# Patient Record
Sex: MALE | Race: WHITE | NOT HISPANIC OR LATINO | Employment: FULL TIME | URBAN - METROPOLITAN AREA
[De-identification: names, ages, dates, MRNs, and addresses within clinical notes are randomized per-mention and may not be internally consistent; named-entity substitution may affect disease eponyms.]

---

## 2020-08-04 PROCEDURE — 99285 EMERGENCY DEPT VISIT HI MDM: CPT | Mod: 25

## 2020-08-04 PROCEDURE — 93010 EKG 12-LEAD: ICD-10-PCS | Mod: ,,, | Performed by: INTERNAL MEDICINE

## 2020-08-04 PROCEDURE — 93005 ELECTROCARDIOGRAM TRACING: CPT

## 2020-08-04 PROCEDURE — 93010 ELECTROCARDIOGRAM REPORT: CPT | Mod: ,,, | Performed by: INTERNAL MEDICINE

## 2020-08-05 ENCOUNTER — HOSPITAL ENCOUNTER (EMERGENCY)
Facility: HOSPITAL | Age: 45
Discharge: HOME | End: 2020-08-05
Attending: EMERGENCY MEDICINE
Payer: COMMERCIAL

## 2020-08-05 VITALS
SYSTOLIC BLOOD PRESSURE: 164 MMHG | HEIGHT: 71 IN | WEIGHT: 275 LBS | DIASTOLIC BLOOD PRESSURE: 115 MMHG | OXYGEN SATURATION: 97 % | TEMPERATURE: 98 F | HEART RATE: 60 BPM | BODY MASS INDEX: 38.5 KG/M2 | RESPIRATION RATE: 14 BRPM

## 2020-08-05 DIAGNOSIS — R07.9 CHEST PAIN: ICD-10-CM

## 2020-08-05 LAB
ALBUMIN SERPL BCP-MCNC: 4.4 G/DL (ref 3.5–5.2)
ALP SERPL-CCNC: 73 U/L (ref 55–135)
ALT SERPL W/O P-5'-P-CCNC: 75 U/L (ref 10–44)
ANION GAP SERPL CALC-SCNC: 9 MMOL/L (ref 8–16)
AST SERPL-CCNC: 55 U/L (ref 10–40)
BASOPHILS # BLD AUTO: 0.06 K/UL (ref 0–0.2)
BASOPHILS NFR BLD: 0.7 % (ref 0–1.9)
BILIRUB SERPL-MCNC: 0.9 MG/DL (ref 0.1–1)
BUN SERPL-MCNC: 12 MG/DL (ref 6–20)
CALCIUM SERPL-MCNC: 9.2 MG/DL (ref 8.7–10.5)
CHLORIDE SERPL-SCNC: 104 MMOL/L (ref 95–110)
CO2 SERPL-SCNC: 27 MMOL/L (ref 23–29)
CREAT SERPL-MCNC: 1.2 MG/DL (ref 0.5–1.4)
DIFFERENTIAL METHOD: ABNORMAL
EOSINOPHIL # BLD AUTO: 0.7 K/UL (ref 0–0.5)
EOSINOPHIL NFR BLD: 7.3 % (ref 0–8)
ERYTHROCYTE [DISTWIDTH] IN BLOOD BY AUTOMATED COUNT: 12.4 % (ref 11.5–14.5)
EST. GFR  (AFRICAN AMERICAN): >60 ML/MIN/1.73 M^2
EST. GFR  (NON AFRICAN AMERICAN): >60 ML/MIN/1.73 M^2
GLUCOSE SERPL-MCNC: 103 MG/DL (ref 70–110)
HCT VFR BLD AUTO: 45.6 % (ref 40–54)
HGB BLD-MCNC: 16.1 G/DL (ref 14–18)
IMM GRANULOCYTES # BLD AUTO: 0.03 K/UL (ref 0–0.04)
IMM GRANULOCYTES NFR BLD AUTO: 0.3 % (ref 0–0.5)
LYMPHOCYTES # BLD AUTO: 3 K/UL (ref 1–4.8)
LYMPHOCYTES NFR BLD: 34 % (ref 18–48)
MCH RBC QN AUTO: 29.6 PG (ref 27–31)
MCHC RBC AUTO-ENTMCNC: 35.3 G/DL (ref 32–36)
MCV RBC AUTO: 84 FL (ref 82–98)
MONOCYTES # BLD AUTO: 0.5 K/UL (ref 0.3–1)
MONOCYTES NFR BLD: 5.6 % (ref 4–15)
NEUTROPHILS # BLD AUTO: 4.7 K/UL (ref 1.8–7.7)
NEUTROPHILS NFR BLD: 52.1 % (ref 38–73)
NRBC BLD-RTO: 0 /100 WBC
PLATELET # BLD AUTO: 202 K/UL (ref 150–350)
PMV BLD AUTO: 10.4 FL (ref 9.2–12.9)
POTASSIUM SERPL-SCNC: 3.5 MMOL/L (ref 3.5–5.1)
PROT SERPL-MCNC: 7.6 G/DL (ref 6–8.4)
RBC # BLD AUTO: 5.44 M/UL (ref 4.6–6.2)
SODIUM SERPL-SCNC: 140 MMOL/L (ref 136–145)
TROPONIN I SERPL DL<=0.01 NG/ML-MCNC: <0.006 NG/ML (ref 0–0.03)
WBC # BLD AUTO: 8.94 K/UL (ref 3.9–12.7)

## 2020-08-05 PROCEDURE — 80053 COMPREHEN METABOLIC PANEL: CPT

## 2020-08-05 PROCEDURE — 84484 ASSAY OF TROPONIN QUANT: CPT

## 2020-08-05 PROCEDURE — 85025 COMPLETE CBC W/AUTO DIFF WBC: CPT

## 2020-08-05 PROCEDURE — 25000003 PHARM REV CODE 250: Performed by: EMERGENCY MEDICINE

## 2020-08-05 RX ORDER — AMLODIPINE BESYLATE 5 MG/1
5 TABLET ORAL DAILY
COMMUNITY

## 2020-08-05 RX ORDER — FAMOTIDINE 20 MG/1
20 TABLET, FILM COATED ORAL 2 TIMES DAILY
Qty: 60 TABLET | Refills: 0 | Status: SHIPPED | OUTPATIENT
Start: 2020-08-05 | End: 2020-09-04

## 2020-08-05 RX ADMIN — LIDOCAINE HYDROCHLORIDE: 20 SOLUTION ORAL; TOPICAL at 03:08

## 2020-08-05 NOTE — ED TRIAGE NOTES
"Pt presents to the ed c/o reolved cp that occurred at approx 8pm last night. Pt states he had a sharp pain that lasted approx 1-2 minutes while simultaneously occurring with what he perceievd as a drop in "blood pressure" and near syncpal episode  "

## 2020-08-05 NOTE — DISCHARGE INSTRUCTIONS
You were seen in the emergency department for chest pain.  Your EKG, labs, exam, are all reassuring.  We are not sure what the cause of your chest pain is however we do not believe it is anything immediately dangerous.  Please follow-up with your primary care provider early this week.  Please return for any new or worsening chest pain, nausea, vomiting, difficulty breathing, coughing up blood, profuse sweating, dizziness, lightheadedness, numbness, weakness, or any other new or worsening concerns.    We noted that your liver enzymes are slightly elevated.  This is not immediately dangerous.  Please discuss this with your primary care provider.

## 2020-08-05 NOTE — PROVIDER PROGRESS NOTES - EMERGENCY DEPT.
" Emergency Department TeleTRIAGE Encounter Note      CHIEF COMPLAINT    Chief Complaint   Patient presents with    Chest Pain     Pt c/o upper left anterior chest pain with a "drop in blood pressure" within the last hour. Pt denies any nausea, vomiting, SOB or cough. Pt took an ASA around 2005.        VITAL SIGNS   Initial Vitals [08/04/20 2111]   BP Pulse Resp Temp SpO2   (!) 176/113 71 16 98.5 °F (36.9 °C) 98 %      MAP       --            ALLERGIES    Review of patient's allergies indicates:   Allergen Reactions    Anise oil Nausea And Vomiting       PROVIDER TRIAGE NOTE  This is a teletriage evaluation of a 45 y.o. male presenting to the ED with c/o chest pain x1 hour.  Patient has risk factors. Initial orders will be placed and care will be transferred to an alternate provider when patient is roomed for a full evaluation. Any additional orders and the final disposition will be determined by that provider.         ORDERS  Labs Reviewed   CBC W/ AUTO DIFFERENTIAL   COMPREHENSIVE METABOLIC PANEL   TROPONIN I       ED Orders (720h ago, onward)    Start Ordered     Status Ordering Provider    08/04/20 2119 08/04/20 2118  Saline lock IV  Once      Ordered DRU JIANG    08/04/20 2119 08/04/20 2118  CBC auto differential  STAT  Collect    Ordered DRU JIANG    08/04/20 2119 08/04/20 2118  Comprehensive metabolic panel  STAT  Collect    Ordered DRU JIANG    08/04/20 2119 08/04/20 2118  Troponin I #1  STAT  Collect    Ordered DRU JIANG    08/04/20 2119 08/04/20 2118  X-Ray Chest AP Portable  1 time imaging      Ordered DRU JIANG    08/04/20 2038 08/04/20 2037  EKG 12-lead  Once  Completed    Completed by REJI STARR on 8/4/2020 at  8:42 PM MARGE AMBROSIO            Virtual Visit Note: The provider triage portion of this emergency department evaluation and documentation was performed via [employer], a HIPAA-compliant telemedicine application, in concert with a tele-presenter in the " room. A face to face patient evaluation with one of my colleagues will occur once the patient is placed in an emergency department room.      DISCLAIMER: This note was prepared with [employer] voice recognition transcription software. Garbled syntax, mangled pronouns, and other bizarre constructions may be attributed to that software system.

## 2023-03-05 ENCOUNTER — HOSPITAL ENCOUNTER (EMERGENCY)
Facility: HOSPITAL | Age: 48
Discharge: HOME | End: 2023-03-05
Attending: EMERGENCY MEDICINE
Payer: COMMERCIAL

## 2023-03-05 VITALS
TEMPERATURE: 99 F | WEIGHT: 275 LBS | DIASTOLIC BLOOD PRESSURE: 95 MMHG | BODY MASS INDEX: 38.5 KG/M2 | HEIGHT: 71 IN | OXYGEN SATURATION: 96 % | HEART RATE: 70 BPM | RESPIRATION RATE: 20 BRPM | SYSTOLIC BLOOD PRESSURE: 142 MMHG

## 2023-03-05 DIAGNOSIS — R07.9 CHEST PAIN: ICD-10-CM

## 2023-03-05 LAB
ALBUMIN SERPL BCP-MCNC: 3.9 G/DL (ref 3.5–5.2)
ALP SERPL-CCNC: 68 U/L (ref 55–135)
ALT SERPL W/O P-5'-P-CCNC: 50 U/L (ref 10–44)
ANION GAP SERPL CALC-SCNC: 13 MMOL/L (ref 8–16)
AST SERPL-CCNC: 41 U/L (ref 10–40)
BASOPHILS # BLD AUTO: 0.07 K/UL (ref 0–0.2)
BASOPHILS NFR BLD: 0.9 % (ref 0–1.9)
BILIRUB SERPL-MCNC: 0.5 MG/DL (ref 0.1–1)
BNP SERPL-MCNC: <10 PG/ML (ref 0–99)
BUN SERPL-MCNC: 15 MG/DL (ref 6–20)
CALCIUM SERPL-MCNC: 9.4 MG/DL (ref 8.7–10.5)
CHLORIDE SERPL-SCNC: 104 MMOL/L (ref 95–110)
CO2 SERPL-SCNC: 24 MMOL/L (ref 23–29)
CREAT SERPL-MCNC: 1.4 MG/DL (ref 0.5–1.4)
DIFFERENTIAL METHOD: NORMAL
EOSINOPHIL # BLD AUTO: 0.5 K/UL (ref 0–0.5)
EOSINOPHIL NFR BLD: 6 % (ref 0–8)
ERYTHROCYTE [DISTWIDTH] IN BLOOD BY AUTOMATED COUNT: 13 % (ref 11.5–14.5)
EST. GFR  (NO RACE VARIABLE): >60 ML/MIN/1.73 M^2
GLUCOSE SERPL-MCNC: 143 MG/DL (ref 70–110)
HCT VFR BLD AUTO: 43.6 % (ref 40–54)
HGB BLD-MCNC: 15.3 G/DL (ref 14–18)
IMM GRANULOCYTES # BLD AUTO: 0.02 K/UL (ref 0–0.04)
IMM GRANULOCYTES NFR BLD AUTO: 0.2 % (ref 0–0.5)
LYMPHOCYTES # BLD AUTO: 2.6 K/UL (ref 1–4.8)
LYMPHOCYTES NFR BLD: 32.7 % (ref 18–48)
MCH RBC QN AUTO: 30.1 PG (ref 27–31)
MCHC RBC AUTO-ENTMCNC: 35.1 G/DL (ref 32–36)
MCV RBC AUTO: 86 FL (ref 82–98)
MONOCYTES # BLD AUTO: 0.6 K/UL (ref 0.3–1)
MONOCYTES NFR BLD: 7.3 % (ref 4–15)
NEUTROPHILS # BLD AUTO: 4.3 K/UL (ref 1.8–7.7)
NEUTROPHILS NFR BLD: 52.9 % (ref 38–73)
NRBC BLD-RTO: 0 /100 WBC
PLATELET # BLD AUTO: 226 K/UL (ref 150–450)
PMV BLD AUTO: 10.4 FL (ref 9.2–12.9)
POTASSIUM SERPL-SCNC: 3.7 MMOL/L (ref 3.5–5.1)
PROT SERPL-MCNC: 7.4 G/DL (ref 6–8.4)
RBC # BLD AUTO: 5.08 M/UL (ref 4.6–6.2)
SODIUM SERPL-SCNC: 141 MMOL/L (ref 136–145)
TROPONIN I SERPL DL<=0.01 NG/ML-MCNC: <0.006 NG/ML (ref 0–0.03)
TROPONIN I SERPL DL<=0.01 NG/ML-MCNC: <0.006 NG/ML (ref 0–0.03)
WBC # BLD AUTO: 8.05 K/UL (ref 3.9–12.7)

## 2023-03-05 PROCEDURE — 85025 COMPLETE CBC W/AUTO DIFF WBC: CPT | Performed by: EMERGENCY MEDICINE

## 2023-03-05 PROCEDURE — 99285 EMERGENCY DEPT VISIT HI MDM: CPT | Mod: 25

## 2023-03-05 PROCEDURE — 80053 COMPREHEN METABOLIC PANEL: CPT | Performed by: EMERGENCY MEDICINE

## 2023-03-05 PROCEDURE — 84484 ASSAY OF TROPONIN QUANT: CPT | Mod: 91 | Performed by: EMERGENCY MEDICINE

## 2023-03-05 PROCEDURE — 83880 ASSAY OF NATRIURETIC PEPTIDE: CPT | Performed by: EMERGENCY MEDICINE

## 2023-03-05 RX ORDER — ASPIRIN 81 MG/1
81 TABLET ORAL DAILY
COMMUNITY

## 2023-03-05 RX ORDER — LISINOPRIL 20 MG/1
20 TABLET ORAL
COMMUNITY
Start: 2023-02-28

## 2023-03-05 NOTE — ED PROVIDER NOTES
Encounter Date: 3/5/2023    SCRIBE #1 NOTE: I, Janet Ac, am scribing for, and in the presence of,  Hadley Whyte MD. the EKG reading. Other sections scribed: HPI, ROS, PE.     History     Chief Complaint   Patient presents with    Chest Pain     Woke up from sleep this morning with left sided chest pain that radiates under arm with sweats, no N/V took 325 ASA PTA     CC: chest pain    HPI: This is a 48 y.o.male patient, with a PMHx of Heart Murmur, HTN, Prostate cancer, High cholesterol, and Sleep apnea, presenting to the ED for further evaluation of stabbing left-sided chest pain beginning this morning. Patient reports the pain woke him up from his sleep. Patient states episode of pain lasted less than 15 minutes. Patient reports associated symptoms of diaphoresis. Patient states he took 325 mg Aspirin PTA for relief. Patient reports having a similar episode of symptoms in the past and was seen here in the ED on 8/5/20. Patient denies any changes in pain with exertion or inspiration. Patient reports he did get a stress test done in the past. Patient denies having a cardiologist at this time. Patient denies taking any blood thinners except a 81 mg Aspirin daily. Patient is a nonsmoker. Patient denies any fever, chills, shortness of breath, abdominal pain, nausea, vomiting, diarrhea, dysuria, or any other associated symptoms. No alleviating or aggravating factors. No known drug allergies.        The history is provided by the patient. No  was used.   Review of patient's allergies indicates:   Allergen Reactions    Anise oil Nausea And Vomiting     Past Medical History:   Diagnosis Date    Heart murmur     Hypertension     Prostate cancer 2017     Past Surgical History:   Procedure Laterality Date    PROSTATE SURGERY  2017     No family history on file.  Social History     Tobacco Use    Smoking status: Never    Smokeless tobacco: Never   Substance Use Topics    Alcohol use: Not Currently     Drug use: Never     Review of Systems   Constitutional:  Positive for diaphoresis. Negative for fever.   HENT:  Negative for congestion and sore throat.    Respiratory:  Negative for shortness of breath.    Cardiovascular:  Positive for chest pain.   Gastrointestinal:  Negative for abdominal pain and nausea.   Genitourinary:  Negative for dysuria.   Musculoskeletal:  Negative for back pain.   Skin:  Negative for rash.   Neurological:  Negative for weakness.   Hematological:  Does not bruise/bleed easily.     Physical Exam     Initial Vitals [03/05/23 0552]   BP Pulse Resp Temp SpO2   (!) 171/109 72 18 98.9 °F (37.2 °C) 98 %      MAP       --         Physical Exam    Nursing note and vitals reviewed.  Constitutional: He appears well-developed and well-nourished.   HENT:   Head: Atraumatic.   Eyes: EOM are normal. Pupils are equal, round, and reactive to light.   Neck: Neck supple. No JVD present.   Normal range of motion.  Cardiovascular:  Normal rate, regular rhythm, normal heart sounds and intact distal pulses.     Exam reveals no gallop and no friction rub.       No murmur heard.  Pulmonary/Chest: Breath sounds normal. No respiratory distress. He exhibits no tenderness.   Abdominal: Abdomen is soft. Bowel sounds are normal. There is no abdominal tenderness.   Rotund abdomen   Musculoskeletal:         General: Edema (trace lower extremity) present. Normal range of motion.      Cervical back: Normal range of motion and neck supple.     Lymphadenopathy:     He has no cervical adenopathy.   Neurological: He is alert and oriented to person, place, and time. He has normal strength.   Skin: Skin is warm and dry.   Psychiatric: He has a normal mood and affect. Thought content normal.       ED Course   Procedures  Labs Reviewed   COMPREHENSIVE METABOLIC PANEL - Abnormal; Notable for the following components:       Result Value    Glucose 143 (*)     AST 41 (*)     ALT 50 (*)     All other components within normal limits    CBC W/ AUTO DIFFERENTIAL   TROPONIN I   B-TYPE NATRIURETIC PEPTIDE   TROPONIN I     EKG Readings: (Independently Interpreted)   Initial Reading: No STEMI. Previous EKG: Compared with most recent EKG Previous EKG Date: August 4th 2020. Rhythm: Normal Sinus Rhythm. Heart Rate: 69. Ectopy: No Ectopy. Clinical Impression: Normal Sinus Rhythm   Nonspecific ST and T wave abnormality   ECG Results              EKG 12-LEAD (Final result)  Result time 03/14/23 11:23:11      Final result by Unknown User (03/14/23 11:23:11)                                      Imaging Results              X-Ray Chest AP Portable (Final result)  Result time 03/05/23 06:57:58      Final result by Wicho Baer MD (03/05/23 06:57:58)                   Impression:      No acute abnormality.      Electronically signed by: Wicho Baer MD  Date:    03/05/2023  Time:    06:57               Narrative:    EXAMINATION:  XR CHEST AP PORTABLE    CLINICAL HISTORY:  Chest Pain;    TECHNIQUE:  Single frontal view of the chest was performed.    COMPARISON:  08/04/2020    FINDINGS:  The lungs are clear with normal appearance of pulmonary vasculature. No pleural effusion. No evident pneumothorax.    The cardiac silhouette is normal in size. The hilar and mediastinal contours are unremarkable.    Bones are intact.                                       Medications - No data to display  Medical Decision Making:   History:   Old Medical Records: I decided to obtain old medical records.  Independently Interpreted Test(s):   I have ordered and independently interpreted EKG Reading(s) - see prior notes  Clinical Tests:   Lab Tests: Reviewed and Ordered  Radiological Study: Reviewed and Ordered  Medical Tests: Reviewed and Ordered  Additional MDM:   Heart Score:    History:          Slightly suspicious.  ECG:             Nonspecific repolarisation disturbance  Age:               45-65 years  Risk factors: 1-2 risk factors  Troponin:       Less than or equal  to normal limit  Final Score: 3       Scribe Attestation:   Scribe #1: I performed the above scribed service and the documentation accurately describes the services I performed. I attest to the accuracy of the note.                   Clinical Impression:   Final diagnoses:  [R07.9] Chest pain        ED Disposition Condition    Discharge Stable          ED Prescriptions    None       Follow-up Information       Follow up With Specialties Details Why Contact Info    Janessa Gonsalez MD Cardiology, Interventional Cardiology Schedule an appointment as soon as possible for a visit   [address]  [address]  [facility] [address]  [phone]      [facility] Emergency Dept Emergency Medicine  As needed [address] [facility] [address] [phone]            I, Matthias Whyte, personally performed the services described in this documentation. All medical record entries made by the scribe were at my direction and in my presence. I have reviewed the chart and agree that the record reflects my personal performance and is accurate and complete.       Matthias Whyte MD  04/04/23 1514

## 2023-03-05 NOTE — ED TRIAGE NOTES
"Pt presents to ED c/o left sided chest pain that radiates to left under arm, sweating and nausea started 15 mins pta.  Described as stabbing, pt reports pain "woke me up out of my sleep."  Denies sob, vomiting, ha, dizziness, weakness or any other symptoms.  Pt reports taking 325 ASA pta.  Pain 6/10.   "